# Patient Record
Sex: FEMALE | Race: WHITE | Employment: FULL TIME | URBAN - METROPOLITAN AREA
[De-identification: names, ages, dates, MRNs, and addresses within clinical notes are randomized per-mention and may not be internally consistent; named-entity substitution may affect disease eponyms.]

---
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
## 2020-11-03 ENCOUNTER — HOSPITAL ENCOUNTER (OUTPATIENT)
Age: 20
Discharge: HOME | End: 2020-11-03
Attending: EMERGENCY MEDICINE
Payer: COMMERCIAL

## 2020-11-03 VITALS
DIASTOLIC BLOOD PRESSURE: 83 MMHG | TEMPERATURE: 97 F | RESPIRATION RATE: 18 BRPM | BODY MASS INDEX: 30.53 KG/M2 | HEIGHT: 66 IN | WEIGHT: 190 LBS | OXYGEN SATURATION: 100 % | HEART RATE: 101 BPM | SYSTOLIC BLOOD PRESSURE: 148 MMHG

## 2020-11-03 DIAGNOSIS — Z20.822 ENCOUNTER FOR LABORATORY TESTING FOR COVID-19 VIRUS: Primary | ICD-10-CM

## 2020-11-03 DIAGNOSIS — J06.9 VIRAL UPPER RESPIRATORY INFECTION: ICD-10-CM

## 2020-11-03 PROCEDURE — 99213 OFFICE O/P EST LOW 20 MIN: CPT | Performed by: EMERGENCY MEDICINE

## 2020-11-03 NOTE — ED PROVIDER NOTES
Patient Seen in: [facility]      History   Patient presents with:  Testing    Stated Complaint: not feeling well    HPI  Patient has had a mild runny nose and cough for the last few days. She feels better today. No fevers or chills.   No hea membranes are moist.      Pharynx: Oropharynx is clear. Eyes:      Conjunctiva/sclera: Conjunctivae normal.   Neck:      Musculoskeletal: Normal range of motion and neck supple. Cardiovascular:      Rate and Rhythm: Normal rate and regular rhythm.

## 2021-06-08 ENCOUNTER — OFFICE VISIT (OUTPATIENT)
Dept: FAMILY MEDICINE CLINIC | Facility: CLINIC | Age: 21
End: 2021-06-08
Payer: COMMERCIAL

## 2021-06-08 VITALS
SYSTOLIC BLOOD PRESSURE: 120 MMHG | WEIGHT: 191.38 LBS | DIASTOLIC BLOOD PRESSURE: 80 MMHG | HEIGHT: 66 IN | HEART RATE: 99 BPM | BODY MASS INDEX: 30.76 KG/M2 | TEMPERATURE: 98 F

## 2021-06-08 DIAGNOSIS — Z30.09 BIRTH CONTROL COUNSELING: ICD-10-CM

## 2021-06-08 DIAGNOSIS — M54.50 LUMBAR SPINE PAIN: ICD-10-CM

## 2021-06-08 DIAGNOSIS — Z23 NEED FOR VACCINATION: ICD-10-CM

## 2021-06-08 DIAGNOSIS — Z12.4 CERVICAL CANCER SCREENING: ICD-10-CM

## 2021-06-08 DIAGNOSIS — Z00.00 ADULT GENERAL MEDICAL EXAM: Primary | ICD-10-CM

## 2021-06-08 DIAGNOSIS — M54.2 CERVICAL SPINE PAIN: ICD-10-CM

## 2021-06-08 PROCEDURE — 3008F BODY MASS INDEX DOCD: CPT | Performed by: FAMILY MEDICINE

## 2021-06-08 PROCEDURE — 99212 OFFICE O/P EST SF 10 MIN: CPT | Performed by: FAMILY MEDICINE

## 2021-06-08 PROCEDURE — 90715 TDAP VACCINE 7 YRS/> IM: CPT | Performed by: FAMILY MEDICINE

## 2021-06-08 PROCEDURE — 90471 IMMUNIZATION ADMIN: CPT | Performed by: FAMILY MEDICINE

## 2021-06-08 PROCEDURE — 99395 PREV VISIT EST AGE 18-39: CPT | Performed by: FAMILY MEDICINE

## 2021-06-08 PROCEDURE — 3074F SYST BP LT 130 MM HG: CPT | Performed by: FAMILY MEDICINE

## 2021-06-08 PROCEDURE — 3079F DIAST BP 80-89 MM HG: CPT | Performed by: FAMILY MEDICINE

## 2021-06-08 NOTE — PROGRESS NOTES
Patient ID: Lazara Cooley is a 24year old female. HPI  Patient presents with:  Physical    Last physical on 12/20/2018. Pt is a student at [employer]. She works at [employer]. Pt does not smoke.      Pt c/o intermittent sharp back pain an 83.8 10/09/2019    MCH 27.2 10/09/2019    MCHC 32.4 10/09/2019    RDW 12.5 10/09/2019    NEPRELIM 11.63 (H) 10/09/2019    NEPERCENT 79.3 10/09/2019    LYPERCENT 12.9 10/09/2019    MOPERCENT 7.0 10/09/2019    EOPERCENT 0.3 10/09/2019    BAPERCENT 0.2 10/09/ [place] [address] 12/20/2018     TSH (uIU/mL)   Date Value   12/20/2018 1.03     =======================================================    Wt Readings from Last 6 Encounters:  06/08/21 : 191 lb 6.4 oz  11/03/20 : 190 lb  11/22/19 : 191 lb (96 %, Z= 1.80)*  10/0 on file    Tobacco Use      Smoking status: Never Smoker      Smokeless tobacco: Never Used    Vaping Use      Vaping Use: Never used    Substance and Sexual Activity      Alcohol use: No      Drug use: No      Sexual activity: Not on file    Other Topics normal and breath sounds normal. No respiratory distress. Lymphadenopathy: She has no cervical adenopathy. Neurological: She is alert and oriented to person, place, and time. She has normal reflexes. No cranial nerve deficit.  DTR 2/4 BLE  Skin: Skin is does not happen all the time and she can sometimes go 10 to 14 days without the pain but if she sits too long she will feel some achiness.   Lumbar spine pain  -     PHYSICAL THERAPY - INTERNAL        Referrals (if applicable)  Orders Placed This Encounter and complete.   Nitin Lambert DO, 6/8/2021, 11:06 AM

## 2021-11-16 ENCOUNTER — OFFICE VISIT (OUTPATIENT)
Dept: OBGYN CLINIC | Facility: CLINIC | Age: 21
End: 2021-11-16
Payer: COMMERCIAL

## 2021-11-16 VITALS
SYSTOLIC BLOOD PRESSURE: 131 MMHG | WEIGHT: 197.38 LBS | DIASTOLIC BLOOD PRESSURE: 86 MMHG | BODY MASS INDEX: 32 KG/M2 | HEART RATE: 91 BPM

## 2021-11-16 DIAGNOSIS — Z01.419 ENCOUNTER FOR GYNECOLOGICAL EXAMINATION WITHOUT ABNORMAL FINDING: Primary | ICD-10-CM

## 2021-11-16 DIAGNOSIS — Z30.09 BIRTH CONTROL COUNSELING: ICD-10-CM

## 2021-11-16 PROBLEM — J06.9 URI, ACUTE: Status: RESOLVED | Noted: 2019-03-08 | Resolved: 2021-11-16

## 2021-11-16 PROCEDURE — 99385 PREV VISIT NEW AGE 18-39: CPT | Performed by: OBSTETRICS & GYNECOLOGY

## 2021-11-16 PROCEDURE — 3075F SYST BP GE 130 - 139MM HG: CPT | Performed by: OBSTETRICS & GYNECOLOGY

## 2021-11-16 PROCEDURE — 3079F DIAST BP 80-89 MM HG: CPT | Performed by: OBSTETRICS & GYNECOLOGY

## 2021-11-16 NOTE — PROGRESS NOTES
Leann Henley is a 24year old female [facility] Patient's last menstrual period was 11/02/2021 (approximate). Patient presents with:  Gyn Exam: ANNUAL EXAM. Never been to gyne prior  Contraception: never active but may be in future  .     OBSTETRICS HISTORY: normocephalic  Neck/Thyroid: thyroid symmetric, no thyromegaly, no nodules, no adenopathy  Lymphatic: no abnormal supraclavicular or axillary adenopathy is noted  Breast:   normal without palpable masses, tenderness, asymmetry, nipple discharge, nipple re

## (undated) NOTE — LETTER
Date & Time: 10/9/2019, 7:18 PM  Patient: Mariposa Barcenas  Encounter Provider(s):    BAKARI Benson       To Whom It May Concern:    Mariposa Barcenas was seen and treated in our department on 10/9/2019.  She can return to work or school until Monday 1

## (undated) NOTE — ED AVS SNAPSHOT
Ms. Joo Galvan   MRN: [id number]    Department:  [facility] Emergency Department   Date of Visit:  10/9/2019           Disclosure     Insurance plans vary and the physician(s) referred by the ER may not be covered by your plan.  Please contac CARE PHYSICIAN AT ONCE OR RETURN IMMEDIATELY TO THE EMERGENCY DEPARTMENT. If you have been prescribed any medication(s), please fill your prescription right away and begin taking the medication(s) as directed.   If you believe that any of the medications

## (undated) NOTE — LETTER
07/08/21        Nneka Jorge  [address]      Dear Keven Dobbins records indicate that you have outstanding lab work and or testing that was ordered for you and has not yet been completed:  Orders Placed This Encounter      CBC With

## (undated) NOTE — LETTER
6/24/2019              [address]         Mary Mireles is currently a patient under my medical care. Patient was seen today for illness and had to be off work on June 19, 2019 as well as June 23, 2019.   She

## (undated) NOTE — LETTER
6/24/2019              [address]         To whom it may concern,    Ines Ulrich is currently a patient under my medical care.   Patient was seen today for illness and had to be off work on June 19, 2019 as

## (undated) NOTE — LETTER
No referring provider defined for this encounter. 11/22/19        Patient: Harper Hurtado   YOB: 2000   Date of Visit: 11/22/2019       Dear  Dr. Radha Lopez, DO,      Thank you for referring Harper Hurtado to my practice.   Andres formation- please see instructions below for a summary of the discussion that took place. Patient feels this is stable and decides to return as needed.      UTI  Patient states that when she was in the ER 10/9/2019 she was told that she had a urinary tract are not bothered by waking up at night to urinate.  Severely restricting calcium in the diet may actually make things worse and we do want you to take in some calcium in your diet, up to 1,200 mg a day; rule of thumb--you may have 2 servings or possibly up

## (undated) NOTE — LETTER
December 5, 2019         Saurabh Colon DO  [address] [facility]      Patient: Leann Henley   YOB: 2000   Date of Visit: 12/5/2019       Dear Dr. Nancy Napoles DO,    I saw your patient, Leann Henley, on 12/5/2019